# Patient Record
Sex: FEMALE | Race: WHITE
[De-identification: names, ages, dates, MRNs, and addresses within clinical notes are randomized per-mention and may not be internally consistent; named-entity substitution may affect disease eponyms.]

---

## 2018-03-24 ENCOUNTER — HOSPITAL ENCOUNTER (OUTPATIENT)
Dept: HOSPITAL 74 - FER | Age: 73
Setting detail: OBSERVATION
Discharge: HOME | End: 2018-03-24
Attending: NURSE PRACTITIONER | Admitting: INTERNAL MEDICINE
Payer: MEDICARE

## 2018-03-24 VITALS — SYSTOLIC BLOOD PRESSURE: 127 MMHG | HEART RATE: 57 BPM | TEMPERATURE: 97.6 F | DIASTOLIC BLOOD PRESSURE: 69 MMHG

## 2018-03-24 VITALS — BODY MASS INDEX: 39 KG/M2
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
## 2018-03-24 DIAGNOSIS — S09.90XA: ICD-10-CM

## 2018-03-24 DIAGNOSIS — Z86.718: ICD-10-CM

## 2018-03-24 DIAGNOSIS — Z87.891: ICD-10-CM

## 2018-03-24 DIAGNOSIS — Z79.01: ICD-10-CM
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
## 2018-03-24 DIAGNOSIS — E78.5: ICD-10-CM

## 2018-03-24 DIAGNOSIS — I48.91: ICD-10-CM
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
## 2018-03-24 DIAGNOSIS — I10: ICD-10-CM

## 2018-03-24 DIAGNOSIS — F32.9: ICD-10-CM

## 2018-03-24 DIAGNOSIS — Z98.84: ICD-10-CM

## 2018-03-24 DIAGNOSIS — Z91.81: ICD-10-CM

## 2018-03-24 DIAGNOSIS — J44.9: ICD-10-CM

## 2018-03-24 DIAGNOSIS — S89.91XA: Primary | ICD-10-CM

## 2018-03-24 LAB
ANION GAP SERPL CALC-SCNC: 6 MMOL/L (ref 8–16)
BASOPHILS # BLD: 0.4 % (ref 0–2)
BUN SERPL-MCNC: 19 MG/DL (ref 7–18)
CALCIUM SERPL-MCNC: 8.8 MG/DL (ref 8.5–10.1)
CHLORIDE SERPL-SCNC: 99 MMOL/L (ref 98–107)
CO2 SERPL-SCNC: 38 MMOL/L (ref 21–32)
CREAT SERPL-MCNC: 0.5 MG/DL (ref 0.55–1.02)
DEPRECATED RDW RBC AUTO: 15.3 % (ref 11.6–15.6)
EOSINOPHIL # BLD: 1.6 % (ref 0–4.5)
GLUCOSE SERPL-MCNC: 95 MG/DL (ref 74–106)
HCT VFR BLD CALC: 39.5 % (ref 32.4–45.2)
HGB BLD-MCNC: 13.1 GM/DL (ref 10.7–15.3)
INR BLD: 2.66 (ref 0.82–1.09)
LYMPHOCYTES # BLD: 25.6 % (ref 8–40)
MCH RBC QN AUTO: 28.6 PG (ref 25.7–33.7)
MCHC RBC AUTO-ENTMCNC: 33 G/DL (ref 32–36)
MCV RBC: 86.5 FL (ref 80–96)
MONOCYTES # BLD AUTO: 9.5 % (ref 3.8–10.2)
NEUTROPHILS # BLD: 62.9 % (ref 42.8–82.8)
PLATELET # BLD AUTO: 216 K/MM3 (ref 134–434)
PMV BLD: 8.5 FL (ref 7.5–11.1)
POTASSIUM SERPLBLD-SCNC: 4 MMOL/L (ref 3.5–5.1)
PT PNL PPP: 30.1 SEC (ref 9.98–11.88)
RBC # BLD AUTO: 4.57 M/MM3 (ref 3.6–5.2)
SODIUM SERPL-SCNC: 143 MMOL/L (ref 136–145)
WBC # BLD AUTO: 6.7 K/MM3 (ref 4–10)

## 2018-03-24 PROCEDURE — G0378 HOSPITAL OBSERVATION PER HR: HCPCS

## 2018-03-24 NOTE — PDOC
History of Present Illness





- General


Chief Complaint: Injury


Stated Complaint: RT LEG BURNING


Time Seen by Provider: 03/24/18 00:30





- History of Present Illness


Initial Comments: 





03/24/18 02:12





Chief complaint: Fall, head injury





History of present illness: 72 years old past medical history significant for 

COPD, hypertension, hyperlipidemia, A. fib on Coumadin presents to the 

emergency department status post mechanical fall. Patient states that she had 

taken a sleeping pill this evening getting ready for bed had gotten up is 

walking states that she took a misstep tripped fell landed on her right lower 

extremity bumped her head. She did not pass out she did not black out she did 

not lose consciousness. She denied any prodrome of headache chest pain 

shortness of breath prior to the fall. She is complaining of mild burning 

sensation to her leg which is persistent constant but she has been able to 

ambulate comfortably on the leg








Past History





- Past Medical History


Allergies/Adverse Reactions: 


 Allergies











Allergy/AdvReac Type Severity Reaction Status Date / Time


 


No Known Allergies Allergy   Verified 06/23/16 11:13











Home Medications: 


Ambulatory Orders





Atorvastatin Ca [Lipitor] 10 mg PO HS 06/23/16 


Escitalopram Oxalate [Lexapro -] 10 mg PO DAILY 06/23/16 


Hydrochlorothiazide [Hctz -] 25 mg PO DAILY 06/23/16 


Warfarin Sodium [Coumadin] 10 mg PO HS 06/23/16 


Diltiazem HCl [Cartia Xt] 180 mg PO DAILY 03/24/18 


Trazodone HCl 50 mg PO HS 03/24/18 








Cardiac Disorders: Yes (AFIB)


COPD: Yes


DVT: Yes


HTN: Yes


Hypercholesterolemia: Yes


Psychiatric Problems: Yes (DEPRESSION)





- Surgical History


Cholecystectomy: Yes


Gastric Stapling:  (GASTRIC SLEEVE)





- Suicide/Smoking/Psychosocial Hx


Smoking History: Never smoked


Have you smoked in the past 12 months: No


Number of Cigarettes Smoked Daily: 40


If you are a former smoker, when did you quit?: 30YRS


Hx Alcohol Use: No


Drug/Substance Use Hx: No


Substance Use Type: None





**Review of Systems





- Review of Systems


Comments:: 





03/24/18 02:12





ROS:  A complete review of 10 out of 10 review of systems is taken and is 

negative apart from what is previously mentioned below and in the HPI.








*Physical Exam





- Vital Signs


 Last Vital Signs











Temp Pulse Resp BP Pulse Ox


 


 98.2 F   68   18   128/75   97 


 


 03/24/18 00:31  03/24/18 00:31  03/24/18 00:31  03/24/18 00:31  03/24/18 00:31














- Physical Exam


Comments: 





03/24/18 02:12





Vitals: Triage Vital signs reviewed  


General Appearance:  no acute distress, well nourished well developed, 


Head: Small abrasion to forehead


Eyes: Pupils equal reactive round, extraocular movement intact


Neck:  Supple;No Nucal rigidity


Chest Wall: Nontender


Cardiac: Irregularly irregular


Lungs: Clear to auscultation bilateral, good air movement bilaterally,


Abdomen:  Soft, non distended, normal bowel sounds, non tender to palpation


Extremities: Full range of motion to all extremities, swelling and ecchymosis 

to the proximal right leg just distal to the kneecap


Skin:  Warm and dry, no rashes or lesions, no rash, no petechiae


Neuro: AOX3; Cranial Nerves 2-12 grossly intact, Strength intact to all 

extremities, Sensation intact to all extremities,gait normal


Psych:  normal mood, normal affect











ED Treatment Course





- LABORATORY


CBC & Chemistry Diagram: 


 03/24/18 00:45





 03/24/18 00:45





Medical Decision Making





- Medical Decision Making





03/24/18 02:14





History of present illness: 72 years old past medical history significant for 

COPD, hypertension, hyperlipidemia, A. fib on Coumadin presents to the 

emergency department status post mechanical fall. Patient states that she had 

taken a sleeping pill this evening getting ready for bed had gotten up is 

walking states that she took a misstep tripped fell landed on her right lower 

extremity bumped her head. She did not pass out she did not black out she did 

not lose consciousness. She denied any prodrome of headache chest pain 

shortness of breath prior to the fall. She is complaining of mild burning 

sensation to her leg which is persistent constant but she has been able to 

ambulate comfortably on the leg





We will obtain labs head CT given that patient is on Coumadin x-ray legs 

observe and reassess.





03/24/18 05:11





Labs imaging all negative for acute trauma





Given patient's age that she is on Coumadin and the risk of delayed bleed while 

on Coumadin as well as the fact that patient is taken a sleeping pill this 

evening and may be difficult to safely observe at home decision made to observe 

patient in hospital overnight





We'll observe patient for neuro checks and reevaluation.








*DC/Admit/Observation/Transfer


Diagnosis at time of Disposition: 


Leg injury


Qualifiers:


 Encounter type: initial encounter Laterality: right Qualified Code(s): 

S89.91XA - Unspecified injury of right lower leg, initial encounter





Head injury


Qualifiers:


 Encounter type: initial encounter Qualified Code(s): S09.90XA - Unspecified 

injury of head, initial encounter








- Discharge Dispostion


Condition at time of disposition: Stable


Admit: Yes





- Referrals





- Patient Instructions





- Post Discharge Activity

## 2018-03-24 NOTE — HP
CHIEF COMPLAINT:Fall while on Coumadin for AF/DVT





PCP:





HISTORY OF PRESENT ILLNESS:


This 72 yr old female is on Coumadin for her AF and hx of DVT.  Last evening 

she took a sleeping pill and overnight she fell landing on her right side 

primarily on her right leg and bumping her head.  She denies any LOC, headache, 

neuro changes and came to ER for evaluation.  The Head CT is negative for acute 

bleed.  She was admitted for overnight evaluation. 





ER course was notable for:


(1)head CT neg


(2)xray to right leg neg for fx


(3)





Recent Travel:





PAST MEDICAL HISTORY:


AF, DVT on coumadin, HTN, depression, HLD


PAST SURGICAL HISTORY:


gastric sleeve, cholecystectomy


Social History:


Smoking:former


Alcohol:none


Drugs: none





Family History:


Allergies"





No Known Allergies Allergy (Verified 06/23/16 11:13)


 








HOME MEDICATIONS:


 Home Medications











 Medication  Instructions  Recorded


 


Atorvastatin Ca [Lipitor] 10 mg PO HS 06/23/16


 


Escitalopram Oxalate [Lexapro -] 10 mg PO DAILY 06/23/16


 


Hydrochlorothiazide [Hctz -] 25 mg PO DAILY 06/23/16


 


Warfarin Sodium [Coumadin] 10 mg PO HS 06/23/16


 


Diltiazem HCl [Cartia Xt] 180 mg PO DAILY 03/24/18


 


Trazodone HCl 50 mg PO HS 03/24/18








REVIEW OF SYSTEMS


CONSTITUTIONAL: 


Absent:  fever, chills, diaphoresis, generalized weakness, malaise, loss of 

appetite, weight change


HEENT: 


Absent:  rhinorrhea, nasal congestion, throat pain, throat swelling, difficulty 

swallowing, mouth swelling, ear pain, eye pain, visual changes


CARDIOVASCULAR: 


Absent: chest pain, syncope, palpitations, irregular heart rate, lightheadedness

, peripheral edema


RESPIRATORY: 


Absent: cough, shortness of breath, dyspnea with exertion, orthopnea, wheezing, 

stridor, hemoptysis


GASTROINTESTINAL:


Absent: abdominal pain, abdominal distension, nausea, vomiting, diarrhea, 

constipation, melena, hematochezia


GENITOURINARY: 


Absent: dysuria, frequency, urgency, hesitancy, hematuria, flank pain, genital 

pain


MUSCULOSKELETAL: 


Absent: myalgia, arthralgia, joint swelling, back pain, neck pain


SKIN: 


Absent: rash, itching, pallor


HEMATOLOGIC/IMMUNOLOGIC: 


Absent: easy bleeding, easy bruising, lymphadenopathy, frequent infections


ENDOCRINE:


Absent: unexplained weight gain, unexplained weight loss, heat intolerance, 

cold intolerance


NEUROLOGIC: 


Absent: headache, focal weakness or paresthesias, dizziness, unsteady gait, 

seizure, mental status changes, bladder or bowel incontinence


PSYCHIATRIC: 


Absent: anxiety, depression, suicidal or homicidal ideation, hallucinations.








PHYSICAL EXAMINATION


 Vital Signs - 24 hr











  03/24/18 03/24/18 03/24/18





  00:31 04:56 05:30


 


Temperature 98.2 F 97.5 F L 97.6 F


 


Pulse Rate 68  57 L


 


Pulse Rate [  65 





Radial]   


 


Respiratory 18 16 19





Rate   


 


Blood Pressure 128/75  127/69


 


Blood Pressure  124/64 





[Arm]   


 


O2 Sat by Pulse 97 96 98





Oximetry (%)   














  03/24/18 03/24/18





  06:56 08:33


 


Temperature 97.6 F 


 


Pulse Rate 57 L 


 


Pulse Rate [  





Radial]  


 


Respiratory 19 





Rate  


 


Blood Pressure 127/69 


 


Blood Pressure  





[Arm]  


 


O2 Sat by Pulse 94 L 94 L





Oximetry (%)  











GENERAL: Awake, alert, and fully oriented, in no acute distress.


HEAD: Normal with no signs of trauma.


EYES: Pupils equal, round and reactive to light, extraocular movements intact, 

sclera anicteric, conjunctiva clear. No lid lag.


EARS, NOSE, THROAT: Ears normal, nares patent, oropharynx clear without 

exudates. Moist mucous membranes.


NECK: Normal range of motion, supple without lymphadenopathy, JVD, or masses.


LUNGS: Breath sounds equal, clear to auscultation bilaterally. No wheezes, and 

no crackles. No accessory muscle use.


HEART: Regular rate and rhythm, normal S1 and S2 without murmur, rub or gallop.


ABDOMEN: Soft, nontender, not distended, normoactive bowel sounds, no guarding, 

no rebound, no masses.  No hepatomegaly or  splenomegaly. 


MUSCULOSKELETAL: Normal range of motion at all joints. No bony deformities or 

tenderness. No CVA tenderness.


UPPER EXTREMITIES: 2+ pulses, warm, well-perfused. No cyanosis. No clubbing. No 

peripheral edema.


LOWER EXTREMITIES: 2+ pulses, warm, well-perfused. No calf tenderness. No 

peripheral edema. 


NEUROLOGICAL:  Cranial nerves II-XII intact. Normal speech. Normal gait.


PSYCHIATRIC: Cooperative. Good eye contact. Appropriate mood and affect.


SKIN: Warm, dry, normal turgor, no rashes or lesions noted, normal capillary 

refill. 


 Laboratory Results - last 24 hr











  03/24/18 03/24/18 03/24/18





  00:45 00:45 00:45


 


WBC    6.7


 


RBC    4.57


 


Hgb    13.1


 


Hct    39.5


 


MCV    86.5


 


MCH    28.6


 


MCHC    33.0


 


RDW    15.3


 


Plt Count    216


 


MPV    8.5


 


Neutrophils %    62.9


 


Lymphocytes %    25.6


 


Monocytes %    9.5


 


Eosinophils %    1.6


 


Basophils %    0.4


 


PT with INR   


 


INR   


 


PTT (Actin FS)  47.3 H  


 


Sodium   143 


 


Potassium   4.0 


 


Chloride   99 


 


Carbon Dioxide   38 H 


 


Anion Gap   6 L 


 


BUN   19 H 


 


Creatinine   0.5 L 


 


Random Glucose   95 


 


Calcium   8.8 


 


Troponin I   < 0.02 














  03/24/18





  00:45


 


WBC 


 


RBC 


 


Hgb 


 


Hct 


 


MCV 


 


MCH 


 


MCHC 


 


RDW 


 


Plt Count 


 


MPV 


 


Neutrophils % 


 


Lymphocytes % 


 


Monocytes % 


 


Eosinophils % 


 


Basophils % 


 


PT with INR  30.10 H


 


INR  2.66 H


 


PTT (Actin FS) 


 


Sodium 


 


Potassium 


 


Chloride 


 


Carbon Dioxide 


 


Anion Gap 


 


BUN 


 


Creatinine 


 


Random Glucose 


 


Calcium 


 


Troponin I 











ASSESSMENT/PLAN:








Problem List





- Problem


(1) Hypertension


Assessment/Plan: 


-monitor blood pressure


-continue medication, cardia xl, HCTZ. 


Code(s): I10 - ESSENTIAL (PRIMARY) HYPERTENSION   


Qualifiers: 


   Hypertension type: essential hypertension   Qualified Code(s): I10 - 

Essential (primary) hypertension   





(2) Atrial fibrillation


Assessment/Plan: 


-chronic AF


-on coumadin


-INR theraputic


-follow INR


Code(s): I48.91 - UNSPECIFIED ATRIAL FIBRILLATION   





(3) Anticoagulated


Code(s): Z79.01 - LONG TERM (CURRENT) USE OF ANTICOAGULANTS   





(4) Head injury


Assessment/Plan: 


-initial head CT neg


-repeat head CT 


Code(s): S09.90XA - UNSPECIFIED INJURY OF HEAD, INITIAL ENCOUNTER   


Qualifiers: 


   Encounter type: initial encounter   Qualified Code(s): S09.90XA - 

Unspecified injury of head, initial encounter   





(5) Leg injury


Assessment/Plan: 


-xray to LE without fx


-ambulating well


-noted large warm, raised hematoma with pain to touch. 


-elevate, compression


-u/s duplex ordered 





Code(s): S89.90XA - UNSPECIFIED INJURY OF UNSPECIFIED LOWER LEG, INIT ENCNTR   


Qualifiers: 


   Encounter type: initial encounter   Laterality: right   Qualified Code(s): 

S89.91XA - Unspecified injury of right lower leg, initial encounter   





Visit type





- Emergency Visit


Emergency Visit: Yes


ED Registration Date: 03/24/18


Care time: The patient presented to the Emergency Department on the above date 

and was hospitalized for further evaluation of their emergent condition.





- New Patient


This patient is new to me today: Yes


Date on this admission: 03/24/18





- Critical Care


Critical Care patient: No





Hospitalist Screening





- Colonoscopy Questionnaire


Colonoscopy Questionnaire: 





Colonoscopy Questionnaire








-   Patient:


50 - 75 years old and never had a screening colonoscopy: No


History of colon or rectal polyps, or CA: No


History of IBD, Crohn's disease or UC: No


History of abdominal radiation therapy as a child: No





-   Relative:


1 with colon or rectal CA, or polyps at age 60 or younger: No


Colon or rectal CA diagnosed at age 45 or younger: No


Multiple relatives with colon or rectal CA: No





-   Outcome:


Screening Result: Negative Screen

## 2018-03-24 NOTE — DS
Physical Exam: 


SUBJECTIVE: Patient seen and examined








OBJECTIVE:





 Vital Signs











 Period  Temp  Pulse  Resp  BP Sys/Rizzo  Pulse Ox


 


 Last 24 Hr  97.5 F-98.2 F  57-68  16-19  124-128/64-75  94-98








PHYSICAL EXAM





GENERAL: The patient is awake, alert, and fully oriented, in no acute distress.


HEAD: Normal with no signs of trauma.


EYES: PERRL, extraocular movements intact, sclera anicteric, conjunctiva clear. 


ENT: Ears normal, nares patent, oropharynx clear without exudates, moist mucous 

membranes.


NECK: Trachea midline, full range of motion, supple. 


LUNGS: Breath sounds equal, clear to auscultation bilaterally, no wheezes, no 

crackles, no accessory muscle use. 


HEART: Regular rate and rhythm, S1, S2 without murmur, rub or gallop.


ABDOMEN: Soft, nontender, nondistended, normoactive bowel sounds, no guarding, 

no rebound, no hepatosplenomegaly, no masses.


EXTREMITIES: 2+ pulses, warm, well-perfused, no edema. with a large hematoma 

tothe right knee distal to the patella. 


NEUROLOGICAL: Cranial nerves II through XII grossly intact. Normal speech, gait 

not observed.


PSYCH: Normal mood, normal affect.


SKIN: Warm, dry, normal turgor, no rashes or lesions noted.





LABS


 Laboratory Results - last 24 hr











  03/24/18 03/24/18 03/24/18





  00:45 00:45 00:45


 


WBC    6.7


 


RBC    4.57


 


Hgb    13.1


 


Hct    39.5


 


MCV    86.5


 


MCH    28.6


 


MCHC    33.0


 


RDW    15.3


 


Plt Count    216


 


MPV    8.5


 


Neutrophils %    62.9


 


Lymphocytes %    25.6


 


Monocytes %    9.5


 


Eosinophils %    1.6


 


Basophils %    0.4


 


PT with INR   


 


INR   


 


PTT (Actin FS)  47.3 H  


 


Sodium   143 


 


Potassium   4.0 


 


Chloride   99 


 


Carbon Dioxide   38 H 


 


Anion Gap   6 L 


 


BUN   19 H 


 


Creatinine   0.5 L 


 


Random Glucose   95 


 


Calcium   8.8 


 


Troponin I   < 0.02 














  03/24/18





  00:45


 


WBC 


 


RBC 


 


Hgb 


 


Hct 


 


MCV 


 


MCH 


 


MCHC 


 


RDW 


 


Plt Count 


 


MPV 


 


Neutrophils % 


 


Lymphocytes % 


 


Monocytes % 


 


Eosinophils % 


 


Basophils % 


 


PT with INR  30.10 H


 


INR  2.66 H


 


PTT (Actin FS) 


 


Sodium 


 


Potassium 


 


Chloride 


 


Carbon Dioxide 


 


Anion Gap 


 


BUN 


 


Creatinine 


 


Random Glucose 


 


Calcium 


 


Troponin I 











HOSPITAL COURSE:





Date of Admission:03/24/18





Date of Discharge: 03/24/18








Pt was admitted for observation due to the fall, injury to the right leg and 

bump on head while on coumadine.  She is theraputic and was admitted for repeat 

head ct.  Initially 1st one was negative and second was negative for bleed.  

Right leg with hematoma was painful, enlarged and warm.  concern for DVT, u/s 

neg as well and compression dressing placed on. Pt ryan be discharge to home 

with specific instructions to follow up with her doctor on Monday morning. 


Minutes to complete discharge: 35





Discharge Summary


Reason For Visit: HEAD & LEG INJURY


Current Active Problems





Anticoagulated (Acute)


Atrial fibrillation (Acute)


Head injury (Acute)


Hypertension (Acute)


Leg injury (Acute)








Condition: Stable





- Instructions


Diet, Activity, Other Instructions: 


regular diet holding back on green leafy vegtables and items with vitmain K 





Keep the ace wrap on and remove to examin wound 2 x day ~ if there is increase 

pain, bleeding, discoloring, or swelling, return to ER or your doctor. 





Follow up with your doctor Monday morning to have him look at your leg and 

measure your INR.  








Disposition: HOME





- Home Medications


Comprehensive Discharge Medication List: 


Ambulatory Orders





Atorvastatin Ca [Lipitor] 10 mg PO HS 06/23/16 


Escitalopram Oxalate [Lexapro -] 10 mg PO DAILY 06/23/16 


Hydrochlorothiazide [Hctz -] 25 mg PO DAILY 06/23/16 


Warfarin Sodium [Coumadin] 10 mg PO HS 06/23/16 


Diltiazem HCl [Cartia Xt] 180 mg PO DAILY 03/24/18 


Trazodone HCl 50 mg PO HS 03/24/18 











Problem List





- Problems


(1) Hypertension


Code(s): I10 - ESSENTIAL (PRIMARY) HYPERTENSION   


Qualifiers: 


   Hypertension type: essential hypertension   Qualified Code(s): I10 - 

Essential (primary) hypertension   





(2) Atrial fibrillation


Code(s): I48.91 - UNSPECIFIED ATRIAL FIBRILLATION   





(3) Anticoagulated


Code(s): Z79.01 - LONG TERM (CURRENT) USE OF ANTICOAGULANTS   





(4) Head injury


Code(s): S09.90XA - UNSPECIFIED INJURY OF HEAD, INITIAL ENCOUNTER   


Qualifiers: 


   Encounter type: initial encounter   Qualified Code(s): S09.90XA - 

Unspecified injury of head, initial encounter   





(5) Leg injury


Code(s): S89.90XA - UNSPECIFIED INJURY OF UNSPECIFIED LOWER LEG, INIT ENCNTR   


Qualifiers: 


   Encounter type: initial encounter   Laterality: right   Qualified Code(s): 

S89.91XA - Unspecified injury of right lower leg, initial encounter   


This patient is new to me today: Yes


Date on this admission: 03/24/18


Emergency Visit: Yes


ED Registration Date: 03/24/18


Care time: The patient presented to the Emergency Department on the above date 

and was hospitalized for further evaluation of their emergent condition.


Critical Care patient: No





- Discharge Referral


Referred to [facility] No

## 2018-03-25 NOTE — EKG
Test Reason : 

Blood Pressure : ***/*** mmHG

Vent. Rate : 054 BPM     Atrial Rate : 468 BPM

   P-R Int : 000 ms          QRS Dur : 072 ms

    QT Int : 436 ms       P-R-T Axes : 000 -08 132 degrees

   QTc Int : 413 ms

 

ATRIAL FIBRILLATION WITH SLOW VENTRICULAR RESPONSE

ANTEROSEPTAL INFARCT , AGE UNDETERMINED

ABNORMAL ECG

WHEN COMPARED WITH ECG OF 09-OCT-2007 15:16,

ATRIAL FIBRILLATION HAS REPLACED SINUS RHYTHM

ANTEROSEPTAL INFARCT IS NOW PRESENT

Confirmed by ISMAEL WALKER MD ([id number]) on 3/25/2018 10:00:26 PM

 

Referred By: MD MAJANO           Confirmed By:ISMAEL WALKER MD
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
## 2019-04-06 NOTE — EKG
Test Reason : 

Blood Pressure : ***/*** mmHG

Vent. Rate : 057 BPM     Atrial Rate : 060 BPM

   P-R Int : 000 ms          QRS Dur : 084 ms

    QT Int : 426 ms       P-R-T Axes : 000 034 066 degrees

   QTc Int : 414 ms

 

*** POOR DATA QUALITY, INTERPRETATION MAY BE ADVERSELY AFFECTED

ATRIAL FIBRILLATION WITH SLOW VENTRICULAR RESPONSE

SEPTAL INFARCT (CITED ON OR BEFORE 24-MAR-2018)

ABNORMAL ECG

WHEN COMPARED WITH ECG OF 24-MAR-2018 00:56,

ST NO LONGER DEPRESSED IN INFERIOR LEADS

Confirmed by SHONDA GOMEZ MD ([id number]) on 4/6/2019 11:36:46 AM

 

Referred By:             Confirmed By:SHONDA GOMEZ MD
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
## 2023-06-06 ENCOUNTER — APPOINTMENT (RX ONLY)
Dept: URBAN - NONMETROPOLITAN AREA CLINIC 12 | Facility: CLINIC | Age: 78
Setting detail: DERMATOLOGY
End: 2023-06-06

## 2023-06-06 DIAGNOSIS — I83.9 ASYMPTOMATIC VARICOSE VEINS OF LOWER EXTREMITIES: ICD-10-CM

## 2023-06-06 DIAGNOSIS — L90.8 OTHER ATROPHIC DISORDERS OF SKIN: ICD-10-CM

## 2023-06-06 DIAGNOSIS — L81.4 OTHER MELANIN HYPERPIGMENTATION: ICD-10-CM

## 2023-06-06 DIAGNOSIS — L57.8 OTHER SKIN CHANGES DUE TO CHRONIC EXPOSURE TO NONIONIZING RADIATION: ICD-10-CM

## 2023-06-06 DIAGNOSIS — D49.2 NEOPLASM OF UNSPECIFIED BEHAVIOR OF BONE, SOFT TISSUE, AND SKIN: ICD-10-CM

## 2023-06-06 DIAGNOSIS — L82.1 OTHER SEBORRHEIC KERATOSIS: ICD-10-CM | Status: STABLE

## 2023-06-06 DIAGNOSIS — D18.0 HEMANGIOMA: ICD-10-CM

## 2023-06-06 DIAGNOSIS — L85.3 XEROSIS CUTIS: ICD-10-CM

## 2023-06-06 DIAGNOSIS — L82.0 INFLAMED SEBORRHEIC KERATOSIS: ICD-10-CM

## 2023-06-06 PROBLEM — I83.92 ASYMPTOMATIC VARICOSE VEINS OF LEFT LOWER EXTREMITY: Status: ACTIVE | Noted: 2023-06-06

## 2023-06-06 PROBLEM — D18.01 HEMANGIOMA OF SKIN AND SUBCUTANEOUS TISSUE: Status: ACTIVE | Noted: 2023-06-06

## 2023-06-06 PROCEDURE — 17110 DESTRUCTION B9 LES UP TO 14: CPT

## 2023-06-06 PROCEDURE — 99203 OFFICE O/P NEW LOW 30 MIN: CPT | Mod: 25

## 2023-06-06 PROCEDURE — ? BIOPSY BY SHAVE METHOD

## 2023-06-06 PROCEDURE — 11102 TANGNTL BX SKIN SINGLE LES: CPT | Mod: 59

## 2023-06-06 PROCEDURE — ? COUNSELING

## 2023-06-06 PROCEDURE — ? ADDITIONAL NOTES

## 2023-06-06 PROCEDURE — ? LIQUID NITROGEN

## 2023-06-06 PROCEDURE — ? SUNSCREEN RECOMMENDATIONS

## 2023-06-06 ASSESSMENT — LOCATION SIMPLE DESCRIPTION DERM
LOCATION SIMPLE: LEFT CHEEK
LOCATION SIMPLE: RIGHT UPPER BACK
LOCATION SIMPLE: RIGHT CHEEK
LOCATION SIMPLE: RIGHT ANTERIOR NECK
LOCATION SIMPLE: LEFT UPPER BACK
LOCATION SIMPLE: RIGHT PRETIBIAL REGION
LOCATION SIMPLE: LEFT FOREARM
LOCATION SIMPLE: LEFT ELBOW
LOCATION SIMPLE: RIGHT SHOULDER
LOCATION SIMPLE: LEFT CALF
LOCATION SIMPLE: RIGHT THIGH
LOCATION SIMPLE: RIGHT FOREARM
LOCATION SIMPLE: RIGHT RING FINGER
LOCATION SIMPLE: LEFT PRETIBIAL REGION
LOCATION SIMPLE: LEFT THIGH
LOCATION SIMPLE: RIGHT BREAST
LOCATION SIMPLE: CHEST

## 2023-06-06 ASSESSMENT — LOCATION DETAILED DESCRIPTION DERM
LOCATION DETAILED: LEFT PROXIMAL CALF
LOCATION DETAILED: RIGHT PROXIMAL DORSAL FOREARM
LOCATION DETAILED: LEFT ANTERIOR DISTAL THIGH
LOCATION DETAILED: LEFT PROXIMAL DORSAL FOREARM
LOCATION DETAILED: RIGHT CENTRAL MALAR CHEEK
LOCATION DETAILED: RIGHT LATERAL SUPERIOR CHEST
LOCATION DETAILED: LEFT MEDIAL MALAR CHEEK
LOCATION DETAILED: RIGHT AXILLARY TAIL OF BREAST
LOCATION DETAILED: LEFT PROXIMAL PRETIBIAL REGION
LOCATION DETAILED: RIGHT ANTERIOR SHOULDER
LOCATION DETAILED: LEFT PROXIMAL RADIAL DORSAL FOREARM
LOCATION DETAILED: LEFT ELBOW
LOCATION DETAILED: RIGHT RING FINGER DISTAL INTERPHALANGEAL JOINT
LOCATION DETAILED: RIGHT PROXIMAL PRETIBIAL REGION
LOCATION DETAILED: RIGHT SUPERIOR UPPER BACK
LOCATION DETAILED: LEFT SUPERIOR MEDIAL UPPER BACK
LOCATION DETAILED: RIGHT INFERIOR LATERAL NECK
LOCATION DETAILED: RIGHT VENTRAL PROXIMAL FOREARM
LOCATION DETAILED: RIGHT ANTERIOR DISTAL THIGH

## 2023-06-06 ASSESSMENT — LOCATION ZONE DERM
LOCATION ZONE: TRUNK
LOCATION ZONE: NECK
LOCATION ZONE: LEG
LOCATION ZONE: FINGER
LOCATION ZONE: FACE
LOCATION ZONE: ARM

## 2023-06-06 NOTE — PROCEDURE: ADDITIONAL NOTES
Additional Notes: Patient complains of skin on thighs loosened since loosing weight. Recommend referral with Dr Felton Hill cosmetic. For eyelids recommend consult with Dr Carola Shelton.
Render Risk Assessment In Note?: no
Detail Level: Simple

## 2023-06-06 NOTE — PROCEDURE: BIOPSY BY SHAVE METHOD
Detail Level: Detailed
Depth Of Biopsy: dermis
Was A Bandage Applied: Yes
Size Of Lesion In Cm: 0
Biopsy Type: H and E
Biopsy Method: double edge Personna blade
Anesthesia Type: 1% lidocaine with epinephrine and a 1:10 solution of 8.4% sodium bicarbonate
Anesthesia Volume In Cc (Will Not Render If 0): 0.5
Hemostasis: Aluminum Chloride
Wound Care: Aquaphor
Dressing: Band-Aid
Destruction After The Procedure: No
Type Of Destruction Used: Curettage
Curettage Text: The wound bed was treated with curettage after the biopsy was performed.
Cryotherapy Text: The wound bed was treated with cryotherapy after the biopsy was performed.
Electrodesiccation Text: The wound bed was treated with electrodesiccation after the biopsy was performed.
Electrodesiccation And Curettage Text: The wound bed was treated with electrodesiccation and curettage after the biopsy was performed.
Silver Nitrate Text: The wound bed was treated with silver nitrate after the biopsy was performed.
Lab: -[id number]
Consent: Written consent was obtained and risks were reviewed including but not limited to scarring, infection, bleeding, scabbing, incomplete removal, nerve damage and allergy to anesthesia.
Post-Care Instructions: I reviewed with the patient in detail post-care instructions. Patient is to keep the biopsy site dry overnight, and then apply bacitracin twice daily until healed. Patient may apply hydrogen peroxide soaks to remove any crusting.
Notification Instructions: Patient will be notified of biopsy results. However, patient instructed to call the office if not contacted within 2 weeks.
Billing Type: [employer]
Information: Selecting Yes will display possible errors in your note based on the variables you have selected. This validation is only offered as a suggestion for you. PLEASE NOTE THAT THE VALIDATION TEXT WILL BE REMOVED WHEN YOU FINALIZE YOUR NOTE. IF YOU WANT TO FAX A PRELIMINARY NOTE YOU WILL NEED TO TOGGLE THIS TO 'NO' IF YOU DO NOT WANT IT IN YOUR FAXED NOTE.

## 2023-06-06 NOTE — PROCEDURE: LIQUID NITROGEN
Show Spray Paint Technique Variable?: Yes
Detail Level: Detailed
Include Z78.9 (Other Specified Conditions Influencing Health Status) As An Associated Diagnosis?: No
Post-Care Instructions: I reviewed with the patient in detail post-care instructions. Patient is to wear sunprotection, and avoid picking at any of the treated lesions. Pt may apply Vaseline to crusted or scabbing areas.
Medical Necessity Information: It is in your best interest to select a reason for this procedure from the list below. All of these items fulfill various CMS LCD requirements except the new and changing color options.
Consent: The patient's consent was obtained including but not limited to risks of crusting, scabbing, blistering, scarring, darker or lighter pigmentary change, recurrence, incomplete removal and infection.
Medical Necessity Clause: This procedure was medically necessary because the lesions that were treated were:
Spray Paint Text: The liquid nitrogen was applied to the skin utilizing a spray paint frosting technique.

## 2024-05-01 ENCOUNTER — OFFICE (OUTPATIENT)
Dept: URBAN - METROPOLITAN AREA CLINIC 121 | Facility: CLINIC | Age: 79
Setting detail: OPHTHALMOLOGY
End: 2024-05-01
Payer: MEDICARE

## 2024-05-01 DIAGNOSIS — H43.811: ICD-10-CM

## 2024-05-01 DIAGNOSIS — H16.223: ICD-10-CM

## 2024-05-01 DIAGNOSIS — H40.013: ICD-10-CM

## 2024-05-01 DIAGNOSIS — H43.391: ICD-10-CM

## 2024-05-01 DIAGNOSIS — H35.033: ICD-10-CM

## 2024-05-01 DIAGNOSIS — Z96.1: ICD-10-CM

## 2024-05-01 DIAGNOSIS — H35.363: ICD-10-CM

## 2024-05-01 PROCEDURE — 92250 FUNDUS PHOTOGRAPHY W/I&R: CPT | Performed by: OPHTHALMOLOGY

## 2024-05-01 PROCEDURE — 92014 COMPRE OPH EXAM EST PT 1/>: CPT | Performed by: OPHTHALMOLOGY

## 2024-05-01 ASSESSMENT — CONFRONTATIONAL VISUAL FIELD TEST (CVF)
OS_FINDINGS: FULL
OD_FINDINGS: FULL

## 2025-07-14 ENCOUNTER — OFFICE (OUTPATIENT)
Facility: LOCATION | Age: 80
Setting detail: OPHTHALMOLOGY
End: 2025-07-14
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
## 2025-07-14 PROCEDURE — NO SHOW FE NO SHOW FEE: Performed by: OPHTHALMOLOGY
